# Patient Record
Sex: MALE | Race: WHITE | Employment: STUDENT | URBAN - METROPOLITAN AREA
[De-identification: names, ages, dates, MRNs, and addresses within clinical notes are randomized per-mention and may not be internally consistent; named-entity substitution may affect disease eponyms.]

---
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
## 2022-04-14 ENCOUNTER — TELEPHONE (OUTPATIENT)
Dept: GASTROENTEROLOGY | Age: 22
End: 2022-04-14

## 2022-04-14 NOTE — TELEPHONE ENCOUNTER
Patient called the office to schedule a colonoscopy. The referral is for diarrhea and requires an office visit and he is 25years old. Please note that the patient was just seen by his GI doctor in [place] on 4/4/22 for diarrhea. This referral came from that GI doctor because the patient lives here. Please check with Dr. Anil Osorio to see if he is willing to see the patient with the possibility that he may still end up going back to the other one. Thank you!

## 2022-04-18 NOTE — TELEPHONE ENCOUNTER
This is a diagnostic colonoscopy assessed by a outside GI, he would need an office visit before the procedure.

## 2022-06-11 ENCOUNTER — APPOINTMENT (OUTPATIENT)
Dept: CT IMAGING | Age: 22
End: 2022-06-11
Payer: COMMERCIAL

## 2022-06-11 ENCOUNTER — HOSPITAL ENCOUNTER (EMERGENCY)
Age: 22
Discharge: HOME | End: 2022-06-11
Attending: EMERGENCY MEDICINE
Payer: COMMERCIAL

## 2022-06-11 VITALS
HEART RATE: 81 BPM | SYSTOLIC BLOOD PRESSURE: 107 MMHG | DIASTOLIC BLOOD PRESSURE: 63 MMHG | OXYGEN SATURATION: 98 % | RESPIRATION RATE: 18 BRPM | TEMPERATURE: 98.2 F

## 2022-06-11 DIAGNOSIS — S39.012A STRAIN OF LUMBAR REGION, INITIAL ENCOUNTER: Primary | ICD-10-CM

## 2022-06-11 PROCEDURE — 99284 EMERGENCY DEPT VISIT MOD MDM: CPT

## 2022-06-11 PROCEDURE — 72131 CT LUMBAR SPINE W/O DYE: CPT

## 2022-06-11 PROCEDURE — 6360000002 HC RX W HCPCS: Performed by: STUDENT IN AN ORGANIZED HEALTH CARE EDUCATION/TRAINING PROGRAM

## 2022-06-11 PROCEDURE — 96372 THER/PROPH/DIAG INJ SC/IM: CPT

## 2022-06-11 RX ORDER — ORPHENADRINE CITRATE 30 MG/ML
60 INJECTION INTRAMUSCULAR; INTRAVENOUS ONCE
Status: COMPLETED | OUTPATIENT
Start: 2022-06-11 | End: 2022-06-11

## 2022-06-11 RX ORDER — CYCLOBENZAPRINE HCL 5 MG
5 TABLET ORAL 2 TIMES DAILY PRN
Qty: 10 TABLET | Refills: 0 | Status: SHIPPED | OUTPATIENT
Start: 2022-06-11 | End: 2022-06-21

## 2022-06-11 RX ORDER — KETOROLAC TROMETHAMINE 30 MG/ML
30 INJECTION, SOLUTION INTRAMUSCULAR; INTRAVENOUS ONCE
Status: COMPLETED | OUTPATIENT
Start: 2022-06-11 | End: 2022-06-11

## 2022-06-11 RX ORDER — LIDOCAINE 50 MG/G
1 PATCH TOPICAL DAILY
Qty: 10 PATCH | Refills: 0 | Status: SHIPPED | OUTPATIENT
Start: 2022-06-11 | End: 2022-06-21

## 2022-06-11 RX ADMIN — ORPHENADRINE CITRATE 60 MG: 30 INJECTION INTRAMUSCULAR; INTRAVENOUS at 19:57

## 2022-06-11 RX ADMIN — KETOROLAC TROMETHAMINE 30 MG: 30 INJECTION, SOLUTION INTRAMUSCULAR; INTRAVENOUS at 19:58

## 2022-06-11 ASSESSMENT — PAIN SCALES - GENERAL
PAINLEVEL_OUTOF10: 10
PAINLEVEL_OUTOF10: 10

## 2022-06-11 ASSESSMENT — ENCOUNTER SYMPTOMS
VOMITING: 0
NAUSEA: 0
ABDOMINAL PAIN: 0
BACK PAIN: 1
SHORTNESS OF BREATH: 0

## 2022-06-11 ASSESSMENT — PAIN DESCRIPTION - LOCATION: LOCATION: BACK

## 2022-06-11 ASSESSMENT — PAIN DESCRIPTION - ORIENTATION: ORIENTATION: MID;LOWER

## 2022-06-11 ASSESSMENT — PAIN - FUNCTIONAL ASSESSMENT: PAIN_FUNCTIONAL_ASSESSMENT: 0-10

## 2022-06-11 NOTE — ED NOTES
Pt. Presents to the ED for low back pain. Pt states that he was seen yesterday for the same issue at [facility] but the pain has gotten to the point where he cannot walk without assistance. Pt states that he has not urinated since yesterday. Pt. States his pain is a 10/10. Pt resp even and non labored. Will continue with plan of care.      Tanya Guillen RN  06/11/22 1950

## 2022-06-12 NOTE — ED NOTES
Pt. Discharge instructions reviewed. Pt has no further questions at this time.       Gemma Simmonds, RN  06/11/22 2586

## 2022-06-12 NOTE — ED PROVIDER NOTES
[facility]  ED  eMERGENCY dEPARTMENT eNCOUnter   Attending Attestation     Pt Name: Tawanna Toussaint  MRN: [id number]  [place] 2000  Date of evaluation: 6/11/22       Tawanna Toussaint is a 25 y.o. male who presents with Back Pain      History: Patient presents with back pain is been on for 1 week. Patient noted this pain started a week ago and walking. Patient states that he does not feel any weakness except for that which is accompanied with the pain. Patient says he feels normal strength aside from weakness secondary to pain. Patient has no urinary incontinence, no fecal incontinence. Exam: Heart rate and rhythm are regular. Lungs are clear to auscultation bilaterally. Abdomen soft, nontender. Patient awake alert and appropriate. Patient is tenderness in the midline of the upper lumbar spine but it is worse over the left paraspinal area. Given worsening pain for 1 week and patient's low BMI concern for some sort of pathologic fracture. Will get CT, if negative plan for symptomatic treatment and discharge follow-up PCP. I performed a history and physical examination of the patient and discussed management with the resident. I reviewed the residents note and agree with the documented findings and plan of care. Any areas of disagreement are noted on the chart. I was personally present for the key portions of any procedures. I have documented in the chart those procedures where I was not present during the key portions. I have personally reviewed all images and agree with the resident's interpretation. I have reviewed the emergency nurses triage note. I agree with the chief complaint, past medical history, past surgical history, allergies, medications, social and family history as documented unless otherwise noted below. Documentation of the HPI, Physical Exam and Medical Decision Making performed by medical students or scribes is based on my personal performance of the HPI, PE and MDM. For Phys Assistant/ Nurse Practitioner cases/documentation I have had a face to face evaluation of this patient and have completed at least one if not all key elements of the E/M (history, physical exam, and MDM). Additional findings are as noted. For APC cases I have personally evaluated and examined the patient in conjunction with the APC and agree with the treatment plan and disposition of the patient as recorded by the APC.     Oksana Zendejas MD  Attending Emergency  Physician       Radha Tavera MD  06/11/22 2024

## 2022-06-12 NOTE — ED PROVIDER NOTES
[facility]  ED  Emergency Department Encounter  Emergency Medicine Resident     Pt Name: Princess Benson  MRN: [id number]  [place] 2000  Date of evaluation: 6/11/22  PCP:  Alexi Rivera MD    [facility]       Chief Complaint   Patient presents with    Back Pain       HISTORY [facility]  (Location/Symptom, Timing/Onset, Context/Setting, Quality, Duration, Modifying Factors,Severity.)      Princess Benson is a 25 y.o. male with no pertinent past medical history presents complaints of lower back pain. Patient states symptoms been ongoing for approximately 1 week and began after walking around at the [employer] with his girlfriend. Patient states he did feel soreness in his lower back after walking around nearly all day however no acute injury and did not feel a \"popping \"or other painful sensation in his back. The next day he awoke and had severe lower back pain. Patient was able to ambulate however states was difficult secondary to the pain. Patient states pain persisted and so he presented to [address] [place] [address],[address] where he had an x-ray performed which was unremarkable and showed no fracture. Patient was discharged home with Tylenol and Motrin. Patient states he has been taking his medications as prescribed however pain is persisted. He denies fevers, chills, unintended weight loss, IV drug use, neurological deficits, urinary retention or numbness in his genital or rectal region. 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 Patient does report he has history of breaking bones as well as sustaining musculoskeletal injuries very easily. PAST MEDICAL / SURGICAL / SOCIAL / FAMILY HISTORY      has no past medical history on file. has no past surgical history on file.     Social History     Socioeconomic History    Marital status: Single     Spouse name: Not on file    Number of children: Not on file    Years of education: Not on file    Highest education level: Not on file   Occupational History    Not on file   Tobacco Use    Smoking status: Never Smoker    Smokeless tobacco: Never Used   Substance and Sexual Activity    Alcohol use: No    Drug use: No    Sexual activity: Never   Other Topics Concern    Not on file   Social History Narrative    Not on file     Social Determinants of Health     Financial Resource Strain:     Difficulty of Paying Living Expenses: Not on file   Food Insecurity:     Worried About Running Out of Food in the Last Year: Not on file    Catalina of Food in the Last Year: Not on file   Transportation Needs:     Lack of Transportation (Medical): Not on file    Lack of Transportation (Non-Medical): Not on file   Physical Activity:     Days of Exercise per Week: Not on file    Minutes of Exercise per Session: Not on file   Stress:     Feeling of Stress : Not on file   Social Connections:     Frequency of Communication with Friends and Family: Not on file    Frequency of Social Gatherings with Friends and Family: Not on file    Attends Islam Services: Not on file    Active Member of [address] [place] [address] or Organizations: Not on file    Attends Club or Organization Meetings: Not on file    Marital Status: Not on file   Intimate Partner Violence:     Fear of Current or Ex-Partner: Not on file    Emotionally Abused: Not on file    Physically Abused: Not on file    Sexually Abused: Not on file   Housing Stability:     Unable to Pay for Housing in the Last Year: Not on file    Number of [place] in the Last Year: Not on file    Unstable Housing in the Last Year: Not on file       History reviewed. No pertinent family history. Allergies:  Patient has no known allergies. Home Medications:  Prior to Admission medications    Medication Sig Start Date End Date Taking?  Authorizing Provider   cyclobenzaprine (FLEXERIL) 5 MG tablet Take 1 tablet by mouth 2 times daily as needed for Muscle spasms 6/11/22 6/21/22 Yes Irving Spain, DO   lidocaine (LIDODERM) 5 % Place 1 patch onto the skin daily for 10 days 12 hours on, 12 hours off. 6/11/22 6/21/22 Yes Irving Spain, DO       REVIEW OF SYSTEMS    (2-9 systems for level 4, 10 or more for level 5)      Review of Systems   Constitutional: Negative for fever. HENT: Negative for congestion. Respiratory: Negative for shortness of breath. Cardiovascular: Negative for chest pain. Gastrointestinal: Negative for abdominal pain, nausea and vomiting. Endocrine: Negative for polyuria. Genitourinary: Negative for difficulty urinating. Musculoskeletal: Positive for back pain, gait problem and myalgias. Skin: Negative for rash. Neurological: Negative for weakness, numbness and headaches. PHYSICAL EXAM   (up to 7 for level 4, 8 or more for level 5)     INITIAL VITALS:    oral temperature is 98.2 °F (36.8 °C). His blood pressure is 107/63 and his pulse is 81. His respiration is 18 and oxygen saturation is 98%. Physical Exam  Constitutional:       Comments: Alert and oriented person, place, time, patient does appear to be in carpal secondary to pain but not ill and nontoxic in appearance. Speaking in full sentences. HENT:      Head: Normocephalic and atraumatic. Eyes:      Pupils: Pupils are equal, round, and reactive to light. Cardiovascular:      Rate and Rhythm: Normal rate and regular rhythm. Pulses: Normal pulses. Pulmonary:      Effort: Pulmonary effort is normal. No respiratory distress. Breath sounds: Normal breath sounds. No wheezing. Abdominal:      General: Abdomen is flat. Palpations: Abdomen is soft. Tenderness: There is no abdominal tenderness. Musculoskeletal:         General: Tenderness (Paraspinal lumbar muscular tenderness) present. Skin:     General: Skin is warm and dry. Capillary Refill: Capillary refill takes less than 2 seconds. to the emergency department with a CT of his lumbar spine, will treat pain with Toradol and Norflex. Given unremarkable neurological exam and no other red flag signs or symptoms find cauda equina unlikely however will obtain postvoid residual.    DIAGNOSTIC RESULTS / EMERGENCY DEPARTMENT COURSE / MDM     LABS:  Labs Reviewed - No data to display      RADIOLOGY:  CT LUMBAR SPINE WO CONTRAST    Result Date: 6/11/2022  EXAMINATION: CT OF THE LUMBAR SPINE WITHOUT CONTRAST  6/11/2022 TECHNIQUE: CT of the lumbar spine was performed without the administration of intravenous contrast. Multiplanar reformatted images are provided for review. Adjustment of mA and/or kV according to patient size was utilized. Automated exposure control, iterative reconstruction, and/or weight based adjustment of the mA/kV was utilized to reduce the radiation dose to as low as reasonably achievable. COMPARISON: None HISTORY: ORDERING SYSTEM PROVIDED HISTORY: 10/10 back pain TECHNOLOGIST PROVIDED HISTORY: 10/10 back pain Decision Support Exception - unselect if not a suspected or confirmed emergency medical condition->Emergency Medical Condition (MA) Reason for Exam: 10/10 back pain FINDINGS: BONES/ALIGNMENT: There is normal alignment of the lumbar spine. The vertebral body heights are maintained. No osseous destructive lesion is seen. No acute fracture or traumatic malalignment. DEGENERATIVE CHANGES: No significant degenerative changes of the lumbar spine. There is a prominent Schmorl's node in the superior endplate of L3. There is no central canal stenosis or nerve root canal stenosis. No lumbar disc herniation. SOFT TISSUES/RETROPERITONEUM: No paraspinal mass is seen. Prominent Schmorl's node in the superior endplate of L3. Otherwise unremarkable lumbar spine CT examination. EMERGENCY DEPARTMENT COURSE:     Postvoid residual is 0 ruling out cauda equina. CT lumbar spine resulted and is unremarkable with no acute fracture. Reassessed after Toradol and Norflex and did report improvement in his symptoms and felt he could manage at home. Girlfriend and mother at bedside to take the patient home. Patient is okay for discharge at this time, return precautions were discussed. Given prescription for Flexeril and lidocaine patch. Discharged home. PROCEDURES:  None    CONSULTS:  None    CRITICAL CARE:  Please see attending note    FINAL IMPRESSION      1.  Strain of lumbar region, initial encounter          DISPOSITION / PLAN     DISPOSITION Decision To Discharge 06/11/2022 08:57:22 PM        PATIENTREFERRED TO:  Georgette Zafar MD  [address] [facility] THE [place] [id number]    Schedule an appointment as soon as possible for a visit   As needed      DISCHARGE MEDICATIONS:  Discharge Medication List as of 6/11/2022  8:58 PM      START taking these medications    Details   cyclobenzaprine (FLEXERIL) 5 MG tablet Take 1 tablet by mouth 2 times daily as needed for Muscle spasms, Disp-10 tablet, R-0Print      lidocaine (LIDODERM) 5 % Place 1 patch onto the skin daily for 10 days 12 hours on, 12 hours off., Disp-10 patch, R-0Print             Azam Michael DO  EmergencyMedicine Resident    (Please note that portions of this note were completed with a voice recognition program.  Efforts were made to edit the dictations but occasionally words are mis-transcribed.)        Azam Michael DO  Resident  06/11/22 [id number]

## 2024-11-06 ENCOUNTER — HOSPITAL ENCOUNTER (OUTPATIENT)
Dept: GENERAL RADIOLOGY | Age: 24
Discharge: HOME | End: 2024-11-08
Payer: COMMERCIAL

## 2024-11-06 ENCOUNTER — HOSPITAL ENCOUNTER (OUTPATIENT)
Age: 24
Discharge: HOME | End: 2024-11-08
Payer: COMMERCIAL

## 2024-11-06 DIAGNOSIS — Z13.828 SCOLIOSIS CONCERN: ICD-10-CM

## 2024-11-06 PROCEDURE — 72082 X-RAY EXAM ENTIRE SPI 2/3 VW: CPT
